# Patient Record
Sex: FEMALE | Race: WHITE | NOT HISPANIC OR LATINO | URBAN - METROPOLITAN AREA
[De-identification: names, ages, dates, MRNs, and addresses within clinical notes are randomized per-mention and may not be internally consistent; named-entity substitution may affect disease eponyms.]

---

## 2017-03-21 ENCOUNTER — EMERGENCY (EMERGENCY)
Facility: HOSPITAL | Age: 72
LOS: 1 days | Discharge: HOME | End: 2017-03-21
Attending: EMERGENCY MEDICINE | Admitting: EMERGENCY MEDICINE
Payer: COMMERCIAL

## 2017-03-21 VITALS
DIASTOLIC BLOOD PRESSURE: 64 MMHG | RESPIRATION RATE: 18 BRPM | OXYGEN SATURATION: 97 % | TEMPERATURE: 98 F | SYSTOLIC BLOOD PRESSURE: 124 MMHG | HEART RATE: 72 BPM

## 2017-03-21 VITALS
TEMPERATURE: 98 F | SYSTOLIC BLOOD PRESSURE: 136 MMHG | OXYGEN SATURATION: 97 % | RESPIRATION RATE: 18 BRPM | HEART RATE: 79 BPM | DIASTOLIC BLOOD PRESSURE: 76 MMHG
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
## 2017-03-21 DIAGNOSIS — S09.90XA UNSPECIFIED INJURY OF HEAD, INITIAL ENCOUNTER: ICD-10-CM
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
## 2017-03-21 DIAGNOSIS — Z79.899 OTHER LONG TERM (CURRENT) DRUG THERAPY: ICD-10-CM

## 2017-03-21 PROCEDURE — 72170 X-RAY EXAM OF PELVIS: CPT

## 2017-03-21 PROCEDURE — 70450 CT HEAD/BRAIN W/O DYE: CPT | Mod: 26

## 2017-03-21 PROCEDURE — 99284 EMERGENCY DEPT VISIT MOD MDM: CPT

## 2017-03-21 PROCEDURE — 99284 EMERGENCY DEPT VISIT MOD MDM: CPT | Mod: 25

## 2017-03-21 PROCEDURE — 70450 CT HEAD/BRAIN W/O DYE: CPT

## 2017-03-21 PROCEDURE — 72170 X-RAY EXAM OF PELVIS: CPT | Mod: 26

## 2017-03-21 RX ORDER — ACETAMINOPHEN 500 MG
975 TABLET ORAL ONCE
Qty: 0 | Refills: 0 | Status: COMPLETED | OUTPATIENT
Start: 2017-03-21 | End: 2017-03-21

## 2017-03-21 RX ORDER — METHOTREXATE 2.5 MG/1
0 TABLET ORAL
Qty: 0 | Refills: 0 | COMMUNITY

## 2017-03-21 RX ORDER — PROGESTERONE 200 MG/1
0 CAPSULE, LIQUID FILLED ORAL
Qty: 0 | Refills: 0 | COMMUNITY

## 2017-03-21 RX ORDER — LOVASTATIN 20 MG
0 TABLET ORAL
Qty: 0 | Refills: 0 | COMMUNITY

## 2017-03-21 RX ADMIN — Medication 975 MILLIGRAM(S): at 09:28

## 2017-03-21 NOTE — ED PROVIDER NOTE - SKIN, MLM
Skin normal color for race, warm, dry and intact. No evidence of rash. exception, tiney abrasion at Rt hand

## 2017-03-21 NOTE — ED PROVIDER NOTE - OBJECTIVE STATEMENT
fall   72 yo with fall , trip and fall, tumbled down [employer] stairs, hit Rt forehead, no LOC, no headache, soreness at forehead, lump forming. no neck pain, discomfort at L buttock, able to walk with assistance up stairs at scene, no hip pain, no chest back or abd pain. no nausea or vomiting, no dizzyness, no blood thinners .

## 2017-03-21 NOTE — ED PROVIDER NOTE - MUSCULOSKELETAL, MLM
Spine appears normal, range of motion is not limited, no muscle or joint tenderness, pt reports pain at L buttock although is non tender to palpation here, FROM at hips and knees without distress, nl Upper extremity exam, non tender C T L spine,

## 2017-03-21 NOTE — ED ADULT NURSE NOTE - CHPI ED SYMPTOMS NEG
no numbness/no vomiting/no deformity/no loss of consciousness/no tingling/no fever/no confusion/no bleeding/no weakness

## 2017-03-21 NOTE — ED ADULT TRIAGE NOTE - CHIEF COMPLAINT QUOTE
[place]/o fall [place], hit her R side of head, c/o L buttock pain, abrasion to R hand. Denies LOC, no blood thinners.

## 2017-03-21 NOTE — ED ADULT NURSE NOTE - OBJECTIVE STATEMENT
[place] in [place]. Pt states she slipped and fell down stairs. PT states she hit her head, complaining of bruising to above right eye and left hip and lower back pain. Per EMS, pt was ambulatory with assistance and with even gait. Pt denies LOC, alert and oriented x3.  Positive PMS x4 extremities.  Pt denies all other medical complaints at this time. Denies chest pain, headache, vision changes, D/N/V.

## 2019-04-24 NOTE — ED ADULT NURSE NOTE - CAS DISCH ACCOMP BY
----- Message from Georges Allred sent at 4/24/2019  2:24 PM CDT -----  Type: Needs Medical Advice    Who Called:  [facility]  Best Call Back Number: [phone]  Additional Information: Daughter called and requested orders to admit patient to home health - please fax to [phone]    
Faxed orders  
Family

## 2020-01-06 NOTE — ED PROVIDER NOTE - MEDICAL DECISION MAKING DETAILS
unknown 72 yo with trip and fall, minimal H/A , doubt ICH however due to age will screen for this w CT head, neck clinically cleared, due to buttock discomfort will screen w xray however low fracture suspicion as able to walk.

## 2021-03-11 NOTE — ED ADULT NURSE NOTE - NEURO WDL
56
Alert and oriented to person, place and time, memory intact, behavior appropriate to situation, PERRL.
